# Patient Record
Sex: MALE | Race: WHITE | NOT HISPANIC OR LATINO | Employment: OTHER | URBAN - METROPOLITAN AREA
[De-identification: names, ages, dates, MRNs, and addresses within clinical notes are randomized per-mention and may not be internally consistent; named-entity substitution may affect disease eponyms.]

---

## 2023-11-02 PROBLEM — T66.XXXA ADVERSE EFFECT OF RADIATION: Status: ACTIVE | Noted: 2023-11-02

## 2023-11-02 PROBLEM — K21.9 LPRD (LARYNGOPHARYNGEAL REFLUX DISEASE): Status: ACTIVE | Noted: 2023-11-02

## 2023-11-02 PROBLEM — H61.21 IMPACTED CERUMEN OF RIGHT EAR: Status: ACTIVE | Noted: 2023-11-02

## 2023-11-02 PROBLEM — C07 CANCER OF PAROTID GLAND (MULTI): Status: ACTIVE | Noted: 2023-11-02

## 2023-11-02 RX ORDER — TAMSULOSIN HYDROCHLORIDE 0.4 MG/1
CAPSULE ORAL
COMMUNITY

## 2023-11-02 RX ORDER — SPIRONOLACTONE 25 MG/1
TABLET ORAL
COMMUNITY

## 2023-11-02 RX ORDER — FLUTICASONE PROPIONATE 50 MCG
SPRAY, SUSPENSION (ML) NASAL
COMMUNITY
Start: 2022-10-24

## 2023-11-02 RX ORDER — SACUBITRIL AND VALSARTAN 49; 51 MG/1; MG/1
TABLET, FILM COATED ORAL
COMMUNITY
Start: 2021-04-20

## 2023-11-02 RX ORDER — ACETAMINOPHEN AND CODEINE PHOSPHATE 300; 30 MG/1; MG/1
TABLET ORAL
COMMUNITY
Start: 2022-09-29

## 2023-11-02 RX ORDER — PREGABALIN 50 MG/1
CAPSULE ORAL
COMMUNITY
Start: 2022-07-11

## 2023-11-02 RX ORDER — TORSEMIDE 20 MG/1
TABLET ORAL
COMMUNITY

## 2023-11-02 RX ORDER — CETIRIZINE HYDROCHLORIDE 10 MG/1
TABLET ORAL
COMMUNITY
Start: 2022-10-24

## 2023-11-02 RX ORDER — METOPROLOL SUCCINATE 50 MG/1
TABLET, EXTENDED RELEASE ORAL
COMMUNITY
Start: 2015-12-28

## 2023-11-02 RX ORDER — DABIGATRAN ETEXILATE 75 MG/1
CAPSULE ORAL
COMMUNITY

## 2023-11-03 ENCOUNTER — OFFICE VISIT (OUTPATIENT)
Dept: OTOLARYNGOLOGY | Facility: CLINIC | Age: 83
End: 2023-11-03
Payer: MEDICARE

## 2023-11-03 VITALS — BODY MASS INDEX: 33.96 KG/M2 | WEIGHT: 264.6 LBS | HEIGHT: 74 IN

## 2023-11-03 DIAGNOSIS — C07 CANCER OF PAROTID GLAND (MULTI): Primary | ICD-10-CM

## 2023-11-03 PROCEDURE — 1126F AMNT PAIN NOTED NONE PRSNT: CPT | Performed by: OTOLARYNGOLOGY

## 2023-11-03 PROCEDURE — 99213 OFFICE O/P EST LOW 20 MIN: CPT | Performed by: OTOLARYNGOLOGY

## 2023-11-03 PROCEDURE — 1036F TOBACCO NON-USER: CPT | Performed by: OTOLARYNGOLOGY

## 2023-11-03 PROCEDURE — 1159F MED LIST DOCD IN RCRD: CPT | Performed by: OTOLARYNGOLOGY

## 2023-11-03 PROCEDURE — 1160F RVW MEDS BY RX/DR IN RCRD: CPT | Performed by: OTOLARYNGOLOGY

## 2023-11-03 RX ORDER — MINERAL OIL
180 ENEMA (ML) RECTAL DAILY
COMMUNITY
Start: 2022-11-19

## 2023-11-03 RX ORDER — TERAZOSIN 5 MG/1
5 CAPSULE ORAL
COMMUNITY
Start: 2014-06-16

## 2023-11-03 RX ORDER — NIFEDIPINE 30 MG/1
30 TABLET, FILM COATED, EXTENDED RELEASE ORAL
COMMUNITY
Start: 2014-06-16

## 2023-11-03 RX ORDER — METAPROTERENOL SULFATE 20 MG
1 TABLET ORAL
COMMUNITY
Start: 2014-06-16

## 2023-11-03 RX ORDER — DULOXETIN HYDROCHLORIDE 30 MG/1
30 CAPSULE, DELAYED RELEASE ORAL DAILY
COMMUNITY
Start: 2023-09-07

## 2023-11-03 RX ORDER — LOSARTAN POTASSIUM AND HYDROCHLOROTHIAZIDE 12.5; 1 MG/1; MG/1
1 TABLET ORAL
COMMUNITY

## 2023-11-03 ASSESSMENT — PAIN SCALES - GENERAL: PAINLEVEL: 0-NO PAIN

## 2023-11-03 ASSESSMENT — PATIENT HEALTH QUESTIONNAIRE - PHQ9
2. FEELING DOWN, DEPRESSED OR HOPELESS: NOT AT ALL
SUM OF ALL RESPONSES TO PHQ9 QUESTIONS 1 & 2: 0
1. LITTLE INTEREST OR PLEASURE IN DOING THINGS: NOT AT ALL

## 2023-11-03 NOTE — PROGRESS NOTES
ENT Follow up Visit    History Of Present Illness  Hamlet Ledesma is a 83 y.o. 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 No problems eating or drinking. No changes in voice. Eating well. No odynophagia  Continues to see dermatology-no recent concerns. Has appt early next year  no recent scans  PCP is following TSH, CXR     Past Medical History  He has a past medical history of Personal history of other diseases of the circulatory system and Personal history of other diseases of the nervous system and sense organs.    Surgical History  He has a past surgical history that includes Back surgery (08/01/2016) and Hernia repair (08/01/2016).     Social History  He has no history on file for tobacco use, alcohol use, and drug use.    Family History  Family History   Problem Relation Name Age of Onset    Cancer Mother      Heart attack Mother      Other (Heart problem) Mother      Cancer Father      Heart attack Father      Other (Heart problem) Father          Allergies  Patient has no known allergies.     Physical Exam:  Well appearing individual in no acute distress. No stertor and no Stridor. Good voice. No LAD. Skin is soft and supple. Right face/neck with radiation changes. Well healed surgical scars from parotidectomy. WLE site at right forehead, suprabrow healing well. LION. Oral cavity and oropharynx are without lesions. No thyromegaly. Anterior rhinoscopy reveals normal nasal mucosa bilaterally. Right EAC with mild cerumen impaction that was removed TM clear. Left EAC patent, TM clear. Cranial Nerves grossly intact.      Assessment and Plan  83 y.o. maleRight SCCa s/p radical parotidectomy, right neck dissection level 1-4 s/p RT completed 11/2016 LION today     -f/u in 1 year  -Contact office earlier with any  concerns  -Continue dermatology surveillance     Alonso Smith MD

## 2024-11-08 ENCOUNTER — APPOINTMENT (OUTPATIENT)
Dept: OTOLARYNGOLOGY | Facility: CLINIC | Age: 84
End: 2024-11-08
Payer: MEDICARE

## 2024-11-08 VITALS — BODY MASS INDEX: 33.8 KG/M2 | WEIGHT: 255 LBS | HEIGHT: 73 IN

## 2024-11-08 DIAGNOSIS — C44.320 SQUAMOUS CELL CARCINOMA OF SKIN OF UNSPECIFIED PARTS OF FACE: Primary | ICD-10-CM

## 2024-11-08 DIAGNOSIS — C07 CANCER OF PAROTID GLAND (MULTI): ICD-10-CM

## 2024-11-08 PROCEDURE — 99213 OFFICE O/P EST LOW 20 MIN: CPT | Performed by: OTOLARYNGOLOGY

## 2024-11-08 PROCEDURE — 1160F RVW MEDS BY RX/DR IN RCRD: CPT | Performed by: OTOLARYNGOLOGY

## 2024-11-08 PROCEDURE — 1126F AMNT PAIN NOTED NONE PRSNT: CPT | Performed by: OTOLARYNGOLOGY

## 2024-11-08 PROCEDURE — 1159F MED LIST DOCD IN RCRD: CPT | Performed by: OTOLARYNGOLOGY

## 2024-11-08 PROCEDURE — 1036F TOBACCO NON-USER: CPT | Performed by: OTOLARYNGOLOGY

## 2024-11-08 ASSESSMENT — PAIN SCALES - GENERAL: PAINLEVEL_OUTOF10: 0-NO PAIN

## 2024-11-08 NOTE — PROGRESS NOTES
ENT Follow up Visit    History Of Present Illness  Hamlet Ledesma is a 84 y.o. 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 No problems eating or drinking. No changes in voice. Eating well. No odynophagia  Continues to see dermatology-no recent concerns.  no recent scans  PCP is following TSH, CXR     Past Medical History  He has a past medical history of Personal history of other diseases of the circulatory system and Personal history of other diseases of the nervous system and sense organs.    Surgical History  He has a past surgical history that includes Back surgery (08/01/2016) and Hernia repair (08/01/2016).     Social History  He reports that he has never smoked. He has never used smokeless tobacco. He reports current alcohol use. He reports that he does not use drugs.    Family History  Family History   Problem Relation Name Age of Onset    Cancer Mother      Heart attack Mother      Other (Heart problem) Mother      Cancer Father      Heart attack Father      Other (Heart problem) Father          Allergies  Patient has no known allergies.     Physical Exam:  Well appearing individual in no acute distress. No stertor and no Stridor. Good voice. No LAD. Skin is soft and supple. Right face/neck with radiation changes. Well healed surgical scars from parotidectomy. WLE site at right forehead, suprabrow healing well. LION. Oral cavity and oropharynx are without lesions. No thyromegaly. Anterior rhinoscopy reveals normal nasal mucosa bilaterally. Right EAC with mild cerumen impaction that was removed TM clear. Left EAC patent, TM clear. Cranial Nerves grossly intact.      Assessment and Plan  84 y.o. maleRight SCCa s/p radical parotidectomy, right neck dissection level 1-4 s/p RT completed 11/2016 LION today     -f/u  in 1 year  -Contact office earlier with any concerns  -Continue dermatology surveillance     Alonso Smith MD

## 2025-11-14 ENCOUNTER — APPOINTMENT (OUTPATIENT)
Facility: CLINIC | Age: 85
End: 2025-11-14
Payer: MEDICARE